# Patient Record
Sex: MALE | Race: WHITE | NOT HISPANIC OR LATINO | Employment: FULL TIME | URBAN - METROPOLITAN AREA
[De-identification: names, ages, dates, MRNs, and addresses within clinical notes are randomized per-mention and may not be internally consistent; named-entity substitution may affect disease eponyms.]

---

## 2017-08-20 ENCOUNTER — HOSPITAL ENCOUNTER (EMERGENCY)
Facility: HOSPITAL | Age: 37
Discharge: HOME | End: 2017-08-20
Attending: EMERGENCY MEDICINE | Admitting: EMERGENCY MEDICINE

## 2017-08-20 VITALS
HEIGHT: 74 IN | SYSTOLIC BLOOD PRESSURE: 123 MMHG | TEMPERATURE: 98 F | OXYGEN SATURATION: 96 % | BODY MASS INDEX: 20.53 KG/M2 | RESPIRATION RATE: 16 BRPM | HEART RATE: 70 BPM | DIASTOLIC BLOOD PRESSURE: 86 MMHG | WEIGHT: 160 LBS

## 2017-08-20 DIAGNOSIS — R11.2 NON-INTRACTABLE VOMITING WITH NAUSEA, UNSPECIFIED VOMITING TYPE: ICD-10-CM

## 2017-08-20 DIAGNOSIS — N50.819 TESTICULAR DISCOMFORT: ICD-10-CM

## 2017-08-20 DIAGNOSIS — E86.9 VOLUME DEPLETION, GASTROINTESTINAL LOSS: Primary | ICD-10-CM

## 2017-08-20 LAB
ALBUMIN SERPL-MCNC: 4.7 G/DL (ref 3.5–5.2)
ALBUMIN/GLOB SERPL: 1.4 G/DL
ALP SERPL-CCNC: 93 U/L (ref 40–129)
ALT SERPL W P-5'-P-CCNC: 17 U/L (ref 5–41)
ANION GAP SERPL CALCULATED.3IONS-SCNC: 15.6 MMOL/L
AST SERPL-CCNC: 19 U/L (ref 5–40)
BACTERIA UR QL AUTO: ABNORMAL /HPF
BASOPHILS # BLD AUTO: 0.12 10*3/MM3 (ref 0–0.2)
BASOPHILS NFR BLD AUTO: 0.9 % (ref 0–2)
BILIRUB SERPL-MCNC: 0.5 MG/DL (ref 0.2–1.2)
BILIRUB UR QL STRIP: ABNORMAL
BUN BLD-MCNC: 17 MG/DL (ref 6–20)
BUN/CREAT SERPL: 18.5 (ref 7–25)
CALCIUM SPEC-SCNC: 9.4 MG/DL (ref 8.6–10.5)
CHLORIDE SERPL-SCNC: 99 MMOL/L (ref 98–107)
CLARITY UR: CLEAR
CO2 SERPL-SCNC: 21.4 MMOL/L (ref 22–29)
COD CRY URNS QL: ABNORMAL /HPF
COLOR UR: ABNORMAL
CREAT BLD-MCNC: 0.92 MG/DL (ref 0.76–1.27)
DEPRECATED RDW RBC AUTO: 37.4 FL (ref 37–54)
EOSINOPHIL # BLD AUTO: 0.09 10*3/MM3 (ref 0.1–0.3)
EOSINOPHIL NFR BLD AUTO: 0.7 % (ref 0–4)
ERYTHROCYTE [DISTWIDTH] IN BLOOD BY AUTOMATED COUNT: 11.6 % (ref 11.5–14.5)
GFR SERPL CREATININE-BSD FRML MDRD: 94 ML/MIN/1.73
GLOBULIN UR ELPH-MCNC: 3.4 GM/DL
GLUCOSE BLD-MCNC: 105 MG/DL (ref 65–99)
GLUCOSE UR STRIP-MCNC: NEGATIVE MG/DL
HCT VFR BLD AUTO: 46.7 % (ref 42–52)
HGB BLD-MCNC: 15.8 G/DL (ref 14–18)
HGB UR QL STRIP.AUTO: ABNORMAL
HYALINE CASTS UR QL AUTO: ABNORMAL /LPF
IMM GRANULOCYTES # BLD: 0.05 10*3/MM3 (ref 0–0.03)
IMM GRANULOCYTES NFR BLD: 0.4 % (ref 0–0.5)
KETONES UR QL STRIP: ABNORMAL
LEUKOCYTE ESTERASE UR QL STRIP.AUTO: NEGATIVE
LIPASE SERPL-CCNC: 20 U/L (ref 13–60)
LYMPHOCYTES # BLD AUTO: 2.24 10*3/MM3 (ref 0.6–4.8)
LYMPHOCYTES NFR BLD AUTO: 16.2 % (ref 20–45)
MCH RBC QN AUTO: 30.2 PG (ref 27–31)
MCHC RBC AUTO-ENTMCNC: 33.8 G/DL (ref 31–37)
MCV RBC AUTO: 89.1 FL (ref 80–94)
MONOCYTES # BLD AUTO: 1.05 10*3/MM3 (ref 0–1)
MONOCYTES NFR BLD AUTO: 7.6 % (ref 3–8)
MUCOUS THREADS URNS QL MICRO: ABNORMAL /HPF
NEUTROPHILS # BLD AUTO: 10.24 10*3/MM3 (ref 1.5–8.3)
NEUTROPHILS NFR BLD AUTO: 74.2 % (ref 45–70)
NITRITE UR QL STRIP: NEGATIVE
NRBC BLD MANUAL-RTO: 0 /100 WBC (ref 0–0)
PH UR STRIP.AUTO: 6 [PH] (ref 4.5–8)
PLATELET # BLD AUTO: 323 10*3/MM3 (ref 140–500)
PMV BLD AUTO: 9.5 FL (ref 7.4–10.4)
POTASSIUM BLD-SCNC: 3.6 MMOL/L (ref 3.5–5.2)
PROT SERPL-MCNC: 8.1 G/DL (ref 6–8.5)
PROT UR QL STRIP: ABNORMAL
RBC # BLD AUTO: 5.24 10*6/MM3 (ref 4.7–6.1)
RBC # UR: ABNORMAL /HPF
REF LAB TEST METHOD: ABNORMAL
SODIUM BLD-SCNC: 136 MMOL/L (ref 136–145)
SP GR UR STRIP: 1.03 (ref 1–1.03)
SQUAMOUS #/AREA URNS HPF: ABNORMAL /HPF
UROBILINOGEN UR QL STRIP: ABNORMAL
WBC NRBC COR # BLD: 13.79 10*3/MM3 (ref 4.8–10.8)
WBC UR QL AUTO: ABNORMAL /HPF

## 2017-08-20 PROCEDURE — 99284 EMERGENCY DEPT VISIT MOD MDM: CPT | Performed by: EMERGENCY MEDICINE

## 2017-08-20 PROCEDURE — 80053 COMPREHEN METABOLIC PANEL: CPT | Performed by: EMERGENCY MEDICINE

## 2017-08-20 PROCEDURE — 83690 ASSAY OF LIPASE: CPT | Performed by: EMERGENCY MEDICINE

## 2017-08-20 PROCEDURE — 96374 THER/PROPH/DIAG INJ IV PUSH: CPT

## 2017-08-20 PROCEDURE — 99283 EMERGENCY DEPT VISIT LOW MDM: CPT

## 2017-08-20 PROCEDURE — 85025 COMPLETE CBC W/AUTO DIFF WBC: CPT | Performed by: EMERGENCY MEDICINE

## 2017-08-20 PROCEDURE — 81001 URINALYSIS AUTO W/SCOPE: CPT | Performed by: EMERGENCY MEDICINE

## 2017-08-20 PROCEDURE — 96361 HYDRATE IV INFUSION ADD-ON: CPT

## 2017-08-20 PROCEDURE — 25010000002 ONDANSETRON PER 1 MG: Performed by: EMERGENCY MEDICINE

## 2017-08-20 RX ORDER — ONDANSETRON 4 MG/1
4 TABLET, FILM COATED ORAL EVERY 6 HOURS PRN
Qty: 30 TABLET | Refills: 0 | Status: SHIPPED | OUTPATIENT
Start: 2017-08-20 | End: 2022-04-01

## 2017-08-20 RX ORDER — SODIUM CHLORIDE 0.9 % (FLUSH) 0.9 %
10 SYRINGE (ML) INJECTION AS NEEDED
Status: DISCONTINUED | OUTPATIENT
Start: 2017-08-20 | End: 2017-08-20 | Stop reason: HOSPADM

## 2017-08-20 RX ORDER — ONDANSETRON 2 MG/ML
4 INJECTION INTRAMUSCULAR; INTRAVENOUS ONCE
Status: COMPLETED | OUTPATIENT
Start: 2017-08-20 | End: 2017-08-20

## 2017-08-20 RX ADMIN — ONDANSETRON 4 MG: 2 INJECTION, SOLUTION INTRAMUSCULAR; INTRAVENOUS at 20:08

## 2017-08-20 RX ADMIN — SODIUM CHLORIDE 1000 ML: 9 INJECTION, SOLUTION INTRAVENOUS at 20:03

## 2017-08-20 NOTE — ED PROVIDER NOTES
"Subjective   History of Present Illness  History of Present Illness    Chief complaint: Tingling in right testicle, vomiting    Location: Right testicle    Quality/Severity:  Mild    Timing/Duration: Intermittent    Modifying Factors: None identified    Associated Symptoms: No fever, no hematemesis, no change in bowel habits    Narrative: 36-year-old male reports that he has been vomiting all day though he tried to go to work.  While there he noted that he was having some discomfort in his right testicle is described as \"tingling\" and he thought maybe it looked a little swollen.  The discomfort comes and goes.  When he has the discomfort it seems to trigger his nausea worse though when he initially started vomiting this morning he did not have right testicular discomfort.  No known trauma.  No fevers reported.  No hematemesis.  No melena or bright red blood per rectum reported.  No reported abdominal discomfort.    Review of Systems all other systems reviewed and are otherwise negative as related chief complaint.    History reviewed. No pertinent past medical history.    No Known Allergies    Past Surgical History:   Procedure Laterality Date   • APPENDECTOMY         Family History   Problem Relation Age of Onset   • No Known Problems Mother    • No Known Problems Father    • No Known Problems Brother    • No Known Problems Brother        Social History     Social History   • Marital status: Single     Spouse name: N/A   • Number of children: N/A   • Years of education: N/A     Social History Main Topics   • Smoking status: Current Every Day Smoker     Packs/day: 0.50     Years: 10.00     Types: Cigarettes   • Smokeless tobacco: None   • Alcohol use No   • Drug use: No   • Sexual activity: Defer     Other Topics Concern   • None     Social History Narrative       ED Triage Vitals   Temp Heart Rate Resp BP SpO2   08/20/17 1944 08/20/17 1944 08/20/17 1944 08/20/17 1944 08/20/17 1944   98.2 °F (36.8 °C) 130 20 127/91 " 98 %      Temp src Heart Rate Source Patient Position BP Location FiO2 (%)   08/20/17 1944 -- 08/20/17 1944 08/20/17 1944 --   Oral  Sitting Right arm      Objective   Physical Exam   Constitutional: He is oriented to person, place, and time. He appears well-developed. No distress.   Very talkative.  No acute distress noted.  Nontoxic-appearing   HENT:   Head: Normocephalic.   Mouth/Throat: Oropharynx is clear and moist.   Eyes: Conjunctivae are normal. No scleral icterus.   Neck: Neck supple.   Painless movement   Cardiovascular: Regular rhythm and intact distal pulses.    Tachycardic   Pulmonary/Chest: Effort normal and breath sounds normal. No respiratory distress.   Abdominal: Soft. There is no tenderness.   Genitourinary:   Genitourinary Comments: Normal external genitourinary exam.  No testicular swelling.  No testicular mass.  No inguinal hernia appreciated.  There is mild discomfort with palpation of the right testicle but no evidence of epididymitis on exam.  No erythema.   Musculoskeletal:   MAEE, normal strength   Neurological: He is alert and oriented to person, place, and time.   Skin: Skin is warm and dry.   Psychiatric: He has a normal mood and affect. Thought content normal.   Nursing note and vitals reviewed.      Procedures         ED Course  ED Course   Comment By Time   Plan laboratory evaluation and IV fluid hydration.  Patient agreeable with this plan.  No evidence of acute testicular pathology at this time to suggest need for transfer for ultrasound evaluation. Agustín Ng MD 08/20 1953   Heart rate vastly improved with IV fluids.  Labs reviewed.  Await urinalysis result for disposition. Agustín Ng MD 08/20 2056      Results for orders placed or performed during the hospital encounter of 08/20/17   Comprehensive Metabolic Panel   Result Value Ref Range    Glucose 105 (H) 65 - 99 mg/dL    BUN 17 6 - 20 mg/dL    Creatinine 0.92 0.76 - 1.27 mg/dL    Sodium 136 136 - 145 mmol/L    Potassium  3.6 3.5 - 5.2 mmol/L    Chloride 99 98 - 107 mmol/L    CO2 21.4 (L) 22.0 - 29.0 mmol/L    Calcium 9.4 8.6 - 10.5 mg/dL    Total Protein 8.1 6.0 - 8.5 g/dL    Albumin 4.70 3.50 - 5.20 g/dL    ALT (SGPT) 17 5 - 41 U/L    AST (SGOT) 19 5 - 40 U/L    Alkaline Phosphatase 93 40 - 129 U/L    Total Bilirubin 0.5 0.2 - 1.2 mg/dL    eGFR Non African Amer 94 >60 mL/min/1.73    Globulin 3.4 gm/dL    A/G Ratio 1.4 g/dL    BUN/Creatinine Ratio 18.5 7.0 - 25.0    Anion Gap 15.6 mmol/L   Lipase   Result Value Ref Range    Lipase 20 13 - 60 U/L   Urinalysis With / Culture If Indicated   Result Value Ref Range    Color, UA Dark Yellow (A) Yellow, Straw    Appearance, UA Clear Clear    pH, UA 6.0 4.5 - 8.0    Specific Gravity, UA 1.032 (H) 1.003 - 1.030    Glucose, UA Negative Negative    Ketones, UA 15 mg/dL (1+) (A) Negative, 80 mg/dL (3+), >=160 mg/dL (4+)    Bilirubin, UA Moderate (2+) (A) Negative    Blood, UA Trace (A) Negative    Protein,  mg/dL (2+) (A) Negative    Leuk Esterase, UA Negative Negative    Nitrite, UA Negative Negative    Urobilinogen, UA 0.2 E.U./dL 0.2 - 1.0 E.U./dL   CBC Auto Differential   Result Value Ref Range    WBC 13.79 (H) 4.80 - 10.80 10*3/mm3    RBC 5.24 4.70 - 6.10 10*6/mm3    Hemoglobin 15.8 14.0 - 18.0 g/dL    Hematocrit 46.7 42.0 - 52.0 %    MCV 89.1 80.0 - 94.0 fL    MCH 30.2 27.0 - 31.0 pg    MCHC 33.8 31.0 - 37.0 g/dL    RDW 11.6 11.5 - 14.5 %    RDW-SD 37.4 37.0 - 54.0 fl    MPV 9.5 7.4 - 10.4 fL    Platelets 323 140 - 500 10*3/mm3    Neutrophil % 74.2 (H) 45.0 - 70.0 %    Lymphocyte % 16.2 (L) 20.0 - 45.0 %    Monocyte % 7.6 3.0 - 8.0 %    Eosinophil % 0.7 0.0 - 4.0 %    Basophil % 0.9 0.0 - 2.0 %    Immature Grans % 0.4 0.0 - 0.5 %    Neutrophils, Absolute 10.24 (H) 1.50 - 8.30 10*3/mm3    Lymphocytes, Absolute 2.24 0.60 - 4.80 10*3/mm3    Monocytes, Absolute 1.05 (H) 0.00 - 1.00 10*3/mm3    Eosinophils, Absolute 0.09 (L) 0.10 - 0.30 10*3/mm3    Basophils, Absolute 0.12 0.00 - 0.20  10*3/mm3    Immature Grans, Absolute 0.05 (H) 0.00 - 0.03 10*3/mm3    nRBC 0.0 0.0 - 0.0 /100 WBC   Urinalysis, Microscopic Only   Result Value Ref Range    RBC, UA 0-2 (A) None Seen /HPF    WBC, UA 0-2 (A) None Seen /HPF    Bacteria, UA None Seen None Seen /HPF    Squamous Epithelial Cells, UA 0-2 None Seen, 0-2 /HPF    Hyaline Casts, UA None Seen None Seen /LPF    Calcium Oxalate Crystals, UA Small/1+ None Seen /HPF    Mucus, UA Large/3+ (A) None Seen, Trace /HPF    Methodology Manual Light Microscopy                  MDM  Number of Diagnoses or Management Options  Non-intractable vomiting with nausea, unspecified vomiting type: new and requires workup  Testicular discomfort:   Volume depletion, gastrointestinal loss:      Amount and/or Complexity of Data Reviewed  Clinical lab tests: reviewed        Final diagnoses:   Volume depletion, gastrointestinal loss   Testicular discomfort   Non-intractable vomiting with nausea, unspecified vomiting type              Medication List      New Prescriptions          ondansetron 4 MG tablet   Commonly known as:  ZOFRAN   Take 1 tablet by mouth Every 6 (Six) Hours As Needed for Nausea or   Vomiting.         Stop          meloxicam 7.5 MG tablet   Commonly known as:  MOBIC           Follow-up Information     Schedule an appointment as soon as possible for a visit with Primary care provider for your choice.    Why:  Regular health maintenance        Go to Emergency department.    Why:  As needed, If symptoms worsen, If symptoms fail to improve               Agustín Ng MD  08/20/17 2262

## 2022-04-01 ENCOUNTER — HOSPITAL ENCOUNTER (EMERGENCY)
Facility: HOSPITAL | Age: 42
Discharge: HOME | End: 2022-04-01
Attending: EMERGENCY MEDICINE | Admitting: EMERGENCY MEDICINE

## 2022-04-01 VITALS
RESPIRATION RATE: 16 BRPM | HEART RATE: 99 BPM | SYSTOLIC BLOOD PRESSURE: 137 MMHG | OXYGEN SATURATION: 99 % | DIASTOLIC BLOOD PRESSURE: 92 MMHG | WEIGHT: 160 LBS | HEIGHT: 75 IN | BODY MASS INDEX: 19.89 KG/M2 | TEMPERATURE: 98 F

## 2022-04-01 DIAGNOSIS — S61.511A LACERATION OF RIGHT WRIST, INITIAL ENCOUNTER: Primary | ICD-10-CM

## 2022-04-01 PROCEDURE — 12002 RPR S/N/AX/GEN/TRNK2.6-7.5CM: CPT

## 2022-04-01 PROCEDURE — 99282 EMERGENCY DEPT VISIT SF MDM: CPT

## 2022-04-01 RX ORDER — BACITRACIN ZINC 500 [USP'U]/G
1 OINTMENT TOPICAL ONCE
Status: COMPLETED | OUTPATIENT
Start: 2022-04-01 | End: 2022-04-01

## 2022-04-01 RX ORDER — LIDOCAINE HYDROCHLORIDE 10 MG/ML
5 INJECTION, SOLUTION EPIDURAL; INFILTRATION; INTRACAUDAL; PERINEURAL ONCE
Status: COMPLETED | OUTPATIENT
Start: 2022-04-01 | End: 2022-04-01

## 2022-04-01 RX ADMIN — LIDOCAINE HYDROCHLORIDE 5 ML: 10 INJECTION, SOLUTION EPIDURAL; INFILTRATION; INTRACAUDAL; PERINEURAL at 19:34

## 2022-04-01 RX ADMIN — BACITRACIN ZINC 1 APPLICATION: 500 OINTMENT TOPICAL at 19:35

## 2022-04-01 NOTE — ED PROVIDER NOTES
EMERGENCY DEPARTMENT ENCOUNTER      Room Number: 14/14    History is provided by the patient, no translation services needed    HPI:    Chief complaint: Laceration    Location: Left anterior wrist    Quality/Severity: Linear, well approximated, bleeding controlled    Timing/Duration: Immediately PTA    Associated Symptoms: Sensation, strength, and ROM intact.    Narrative: Pt is a 41 y.o. male who presents complaining of a laceration located on the left inner wrist.  Patient states he cut it with scissors accidentally while attempting to open a can.  This occurred immediately prior to arrival.  Sensation, strength, and range of motion intact.  Patient is right-hand dominant.  Works at [employer].      PMD: Provider, No Known    REVIEW OF SYSTEMS  Review of Systems   Constitutional: Negative for fever.   HENT: Negative for congestion.    Respiratory: Negative for shortness of breath.    Cardiovascular: Negative for chest pain.   Gastrointestinal: Negative for abdominal pain.   Musculoskeletal: Negative for gait problem.   Skin: Positive for wound.   Neurological: Negative for weakness and numbness.   Psychiatric/Behavioral: Negative for suicidal ideas.         PAST MEDICAL HISTORY  Active Ambulatory Problems     Diagnosis Date Noted   • No Active Ambulatory Problems     Resolved Ambulatory Problems     Diagnosis Date Noted   • No Resolved Ambulatory Problems     No Additional Past Medical History       PAST SURGICAL HISTORY  Past Surgical History:   Procedure Laterality Date   • APPENDECTOMY         FAMILY HISTORY  Family History   Problem Relation Age of Onset   • No Known Problems Mother    • No Known Problems Father    • No Known Problems Brother    • No Known Problems Brother        SOCIAL HISTORY  Social History     Socioeconomic History   • Marital status: Single   Tobacco Use   • Smoking status: Current Every Day Smoker     Packs/day: 1.00     Years: 10.00     Pack years: 10.00     Types: Cigarettes    Substance and Sexual Activity   • Alcohol use: No   • Drug use: No   • Sexual activity: Defer       ALLERGIES  Patient has no known allergies.    No current facility-administered medications for this encounter.  No current outpatient medications on file.    PHYSICAL EXAM  ED Triage Vitals [04/01/22 1830]   Temp Heart Rate Resp BP SpO2   98 °F (36.7 °C) 99 16 137/92 99 %      Temp src Heart Rate Source Patient Position BP Location FiO2 (%)   -- Monitor Sitting Right arm --       Physical Exam  Constitutional:       General: He is not in acute distress.     Appearance: He is normal weight. He is not ill-appearing.   HENT:      Head: Normocephalic and atraumatic.   Pulmonary:      Effort: Pulmonary effort is normal.   Musculoskeletal:         General: Signs of injury present. No swelling or deformity. Normal range of motion.      Cervical back: Normal range of motion.   Skin:     Capillary Refill: Capillary refill takes less than 2 seconds.      Findings: Lesion present.      Comments: 3.5 cm linear well approximated laceration with bleeding controlled on inner left wrist.  Radial pulse intact.  Distal sensation and capillary refill intact.  Range of motion of fingers and wrist intact, but mildly limited due to pain.  Strength of fingers intact.   Neurological:      General: No focal deficit present.      Mental Status: He is alert and oriented to person, place, and time.   Psychiatric:         Mood and Affect: Mood normal.         Behavior: Behavior normal.           LAB RESULTS  Lab Results (last 24 hours)     ** No results found for the last 24 hours. **            RADIOLOGY  No Radiology Exams Resulted Within Past 24 Hours     PROCEDURES  Laceration Repair    Date/Time: 4/1/2022 7:29 PM  Performed by: Janna Ng PA-C  Authorized by: Jose D Bernstein MD     Consent:     Consent obtained:  Verbal    Consent given by:  Patient    Risks discussed:  Infection, pain, nerve damage, need for additional repair and tendon  damage    Alternatives discussed:  Referral ([place] hand)  [place] protocol:     Procedure explained and questions answered to patient or proxy's satisfaction: yes    Anesthesia:     Anesthesia method:  Local infiltration    Local anesthetic:  Lidocaine 2% w/o epi  Laceration details:     Location:  Hand    Hand location:  L wrist    Length (cm):  3.5  Pre-procedure details:     Preparation:  Patient was prepped and draped in usual sterile fashion  Exploration:     Hemostasis achieved with:  Direct pressure  Treatment:     Amount of cleaning:  Standard    Irrigation solution:  Sterile saline    Irrigation method:  Syringe  Skin repair:     Repair method:  Sutures    Suture size:  5-0    Suture material:  Nylon    Suture technique:  Simple interrupted    Number of sutures:  5  Approximation:     Approximation:  Close  Post-procedure details:     Procedure completion:  Tolerated well, no immediate complications          PROGRESS AND CONSULTS           MEDICAL DECISION MAKING    MDM     My differential diagnosis includes was not limited to: Laceration, tendon disruption, bleeding, fracture, dislocation    DIAGNOSIS  Final diagnoses:   Laceration of right wrist, initial encounter       Latest Documented Vital Signs:  As of 22:17 EDT  BP- 137/92 HR- 99 Temp- 98 °F (36.7 °C) O2 sat- 99%    DISPOSITION  Discharged home.    Discussed pertinent findings with the patient/family.  Patient/Family voiced understanding of need to follow-up for recheck and further testing as needed.  Return to the Emergency Department warnings were given.         Medication List      No changes were made to your prescriptions during this visit.              Follow-up Information     Provider, No Known.    Contact information:  [facility] [address] [phone]                           Dictated utilizing Dragon dictation     Janna Ng PA-C  04/01/22 [id number]       Janna Ng PA-C  04/06/22 2654       Janna Ng  REINA  04/06/22 8961

## 2022-04-01 NOTE — DISCHARGE INSTRUCTIONS
Suture removal in 10 to 14 days.  See attached packet for further information regarding laceration care.  Return to the emergency department for medical emergencies.

## 2022-04-11 ENCOUNTER — HOSPITAL ENCOUNTER (EMERGENCY)
Facility: HOSPITAL | Age: 42
Discharge: HOME | End: 2022-04-11
Attending: EMERGENCY MEDICINE | Admitting: EMERGENCY MEDICINE

## 2022-04-11 VITALS
BODY MASS INDEX: 19.25 KG/M2 | HEIGHT: 74 IN | OXYGEN SATURATION: 96 % | TEMPERATURE: 97.5 F | DIASTOLIC BLOOD PRESSURE: 85 MMHG | HEART RATE: 79 BPM | WEIGHT: 150 LBS | RESPIRATION RATE: 16 BRPM | SYSTOLIC BLOOD PRESSURE: 125 MMHG

## 2022-04-11 DIAGNOSIS — Z48.02 VISIT FOR SUTURE REMOVAL: Primary | ICD-10-CM

## 2022-04-11 PROCEDURE — 99282 EMERGENCY DEPT VISIT SF MDM: CPT

## 2022-04-11 PROCEDURE — 99024 POSTOP FOLLOW-UP VISIT: CPT | Performed by: EMERGENCY MEDICINE

## 2022-04-11 NOTE — ED PROVIDER NOTES
Subjective   History of Present Illness  History of Present Illness    Chief complaint: Suture removal    Location: Left wrist    Quality/Severity: None    Timing/Duration: Sutures present for 10 days    Modifying Factors: None    Narrative: This patient presents for removal of sutures that were placed in his left wrist from an accidental laceration.  He denies any pain or swelling or redness or fevers or drainage or bleeding.    Associated Symptoms: As above    Review of Systems   Constitutional: Negative for activity change, diaphoresis and fever.   HENT: Negative.    Respiratory: Negative for cough and shortness of breath.    Cardiovascular: Negative for chest pain.   Gastrointestinal: Negative for abdominal pain.   Skin: Positive for wound. Negative for color change and rash.   Neurological: Negative for syncope, weakness and numbness.   All other systems reviewed and are negative.      No past medical history on file.    No Known Allergies    Past Surgical History:   Procedure Laterality Date   • APPENDECTOMY         Family History   Problem Relation Age of Onset   • No Known Problems Mother    • No Known Problems Father    • No Known Problems Brother    • No Known Problems Brother        Social History     Socioeconomic History   • Marital status: Single   Tobacco Use   • Smoking status: Current Every Day Smoker     Packs/day: 1.00     Years: 10.00     Pack years: 10.00     Types: Cigarettes   Substance and Sexual Activity   • Alcohol use: No   • Drug use: No   • Sexual activity: Defer       ED Triage Vitals [04/11/22 1207]   Temp Heart Rate Resp BP SpO2   97.5 °F (36.4 °C) 79 16 125/85 96 %      Temp src Heart Rate Source Patient Position BP Location FiO2 (%)   Oral Monitor -- -- --         Objective   Physical Exam  Vitals and nursing note reviewed.   Constitutional:       Appearance: He is well-developed.   HENT:      Head: Normocephalic and atraumatic.   Eyes:      General:         Right eye: No discharge.          Left eye: No discharge.      Pupils: Pupils are equal, round, and reactive to light.   Cardiovascular:      Rate and Rhythm: Normal rate and regular rhythm.   Pulmonary:      Effort: Pulmonary effort is normal. No respiratory distress.   Musculoskeletal:         General: No deformity. Normal range of motion.      Cervical back: Normal range of motion and neck supple.      Comments: The left wrist wound appears to be well-healing.  4 sutures are present.  There is no erythema or induration or drainage noted.  Distal neurovascular exam is still normal.   Skin:     General: Skin is warm and dry.      Findings: No erythema or rash.   Neurological:      Mental Status: He is alert and oriented to person, place, and time. Mental status is at baseline.   Psychiatric:         Behavior: Behavior normal.         Thought Content: Thought content normal.         Judgment: Judgment normal.         Procedures           ED Course  ED Course as of 04/14/22 0759   Thu Apr 14, 2022   0758 Wound looks to be very well healing and clean.  4 sutures removed.  I applied steri strips over the wound.  Patient discharged home in good condition [MARIA FERNANDA]      ED Course User Index  [MARIA FERNANDA] Jose D Bernstein MD                                                 [facility]    Final diagnoses:   Visit for suture removal       ED Disposition  ED Disposition     ED Disposition   Discharge    Condition   Stable    Comment   --             Rachel Huang MD  [address] [facility] [place] [id number] [phone]    Schedule an appointment as soon as possible for a visit   Recommend follow-up visit with local PCP as we discussed         Medication List      No changes were made to your prescriptions during this visit.          Jose D Bernstein MD  04/14/22 0759

## 2022-04-11 NOTE — DISCHARGE INSTRUCTIONS
Keep wound clean and protected until it is well-healed and the Steri-Strips fall off.  Please return to the emergency room for any worsening pain, swelling, redness, fevers, or any other concerns.